# Patient Record
Sex: MALE | Race: OTHER
[De-identification: names, ages, dates, MRNs, and addresses within clinical notes are randomized per-mention and may not be internally consistent; named-entity substitution may affect disease eponyms.]

---
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
## 2022-06-13 NOTE — H&P PST ADULT - SPO2 (%)
98 Minoxidil Pregnancy And Lactation Text: This medication has not been assigned a Pregnancy Risk Category but animal studies failed to show danger with the topical medication. It is unknown if the medication is excreted in breast milk.

## 2025-03-31 NOTE — H&P PST ADULT - ANESTHESIA, PREVIOUS REACTION, PROFILE
After discussing risks and benefits of medication along with side effects will start the following:   Orders:  •  rifaximin (XIFAXAN) 550 mg tablet; Take 1 tablet (550 mg total) by mouth every 8 (eight) hours for 14 days   none